# Patient Record
Sex: FEMALE | Race: WHITE | Employment: FULL TIME | URBAN - METROPOLITAN AREA
[De-identification: names, ages, dates, MRNs, and addresses within clinical notes are randomized per-mention and may not be internally consistent; named-entity substitution may affect disease eponyms.]

---

## 2017-09-03 ENCOUNTER — HOSPITAL ENCOUNTER (OUTPATIENT)
Age: 57
Discharge: HOME | End: 2017-09-03
Attending: EMERGENCY MEDICINE
Payer: COMMERCIAL

## 2017-09-03 VITALS
DIASTOLIC BLOOD PRESSURE: 65 MMHG | OXYGEN SATURATION: 97 % | SYSTOLIC BLOOD PRESSURE: 142 MMHG | HEART RATE: 66 BPM | RESPIRATION RATE: 22 BRPM | TEMPERATURE: 98 F

## 2017-09-03 DIAGNOSIS — S93.692A RUPTURE OF PLANTAR FASCIA OF LEFT FOOT, INITIAL ENCOUNTER: Primary | ICD-10-CM

## 2017-09-03 PROCEDURE — 99202 OFFICE O/P NEW SF 15 MIN: CPT

## 2017-09-03 RX ORDER — CITALOPRAM 20 MG/1
20 TABLET ORAL DAILY
COMMUNITY

## 2017-09-03 RX ORDER — FLUTICASONE PROPIONATE 50 MCG
SPRAY, SUSPENSION (ML) NASAL DAILY
COMMUNITY

## 2017-09-03 RX ORDER — CETIRIZINE HYDROCHLORIDE 10 MG/1
10 TABLET ORAL DAILY
COMMUNITY

## 2017-09-03 NOTE — ED INITIAL ASSESSMENT (HPI)
Patient presents with left foot pain. She is being treated for plantar fasciitis and received a cortisone injection approximately one month ago. Yesterday, she stepped on the foot wrong and felt a pop on the bottom aspect of her foot near the heel.  Pain ha

## 2019-09-07 NOTE — ED PROVIDER NOTES
Patient Seen in: [facility]    History   Patient presents with:   Foot Pain    Stated Complaint: foot pain    HPI    The patient is a 51-year-old female with a history of plantar fasciitis for the last few months, 1 month dorsiflexion of the foot  Nontender Achilles and deltoid    ED Course   Labs Reviewed - No data to display    ============================================================  ED Course  ------------------------------------------------------------  JOSEA Trejo impairments found